# Patient Record
Sex: MALE | Race: WHITE | NOT HISPANIC OR LATINO | Employment: OTHER | URBAN - METROPOLITAN AREA
[De-identification: names, ages, dates, MRNs, and addresses within clinical notes are randomized per-mention and may not be internally consistent; named-entity substitution may affect disease eponyms.]

---

## 2024-10-18 ENCOUNTER — OFFICE VISIT (OUTPATIENT)
Dept: ENDOCRINOLOGY | Facility: CLINIC | Age: 54
End: 2024-10-18
Payer: MEDICAID

## 2024-10-18 VITALS
HEIGHT: 69 IN | WEIGHT: 211 LBS | BODY MASS INDEX: 31.25 KG/M2 | HEART RATE: 66 BPM | SYSTOLIC BLOOD PRESSURE: 154 MMHG | OXYGEN SATURATION: 99 % | DIASTOLIC BLOOD PRESSURE: 84 MMHG

## 2024-10-18 DIAGNOSIS — N62 GYNECOMASTIA: Primary | ICD-10-CM

## 2024-10-18 PROCEDURE — 84146 ASSAY OF PROLACTIN: CPT | Performed by: INTERNAL MEDICINE

## 2024-10-18 PROCEDURE — 83001 ASSAY OF GONADOTROPIN (FSH): CPT | Performed by: INTERNAL MEDICINE

## 2024-10-18 PROCEDURE — 83002 ASSAY OF GONADOTROPIN (LH): CPT | Performed by: INTERNAL MEDICINE

## 2024-10-18 PROCEDURE — 84403 ASSAY OF TOTAL TESTOSTERONE: CPT | Performed by: INTERNAL MEDICINE

## 2024-10-18 PROCEDURE — 84402 ASSAY OF FREE TESTOSTERONE: CPT | Performed by: INTERNAL MEDICINE

## 2024-10-18 PROCEDURE — 84270 ASSAY OF SEX HORMONE GLOBUL: CPT | Performed by: INTERNAL MEDICINE

## 2024-10-18 NOTE — ASSESSMENT & PLAN NOTE
He had transient gynecomastia while on testosterone injections.  No gynecomastia noted on exam today.    He has had mildly low total but normal free testosterone.  This may be due to low sex hormone binding globulin.  I would be reluctant to treat with testosterone at this time, especially with the gynecomastia.  I wonder if sleep apnea could be playing a role.  We discussed alternatives to CPAP.  He will investigate this further.

## 2024-10-18 NOTE — PROGRESS NOTES
"     Office Note      Date: 10/18/2024  Patient Name: Rj Nascimento  MRN: [id number]  : 1970    Chief Complaint   Patient presents with    Gynecomastia       History of Present Illness:   Rj Nascimento is a 53 y.o. male who presents for Gynecomastia    He saw his PCP about a year and 1/2 ago.  He was started on testosterone injections.  He notes breast tenderness on the right that started while on testosterone injections.  It started about 6 months after starting the injections.  The testosterone was stopped about a year ago.  The tenderness resolved.  He has mammogram that was reportedly okay.  Labs  from 2024 have shown mildly low total but normal free testosterone.  DHEA, LH and estradiol were normal.  BMP and TFTs were normal.  He denies taking any supplements.  He reports energy level is low.  He has been tested for sleep apnea but didn't tolerate CPAP treatment.  He hasn't noted any change in the size of his testes.  He notes decreased libido.  He denies any cardiovascular disease.  He denies any problems with prostate.  He denies any urinary complaints.      Subjective      Review of Systems:   Review of Systems   Constitutional:  Positive for fatigue.   HENT: Negative.     Eyes: Negative.    Respiratory: Negative.     Cardiovascular: Negative.    Gastrointestinal: Negative.    Endocrine: Negative.    Genitourinary: Negative.    Musculoskeletal: Negative.    Skin: Negative.    Allergic/Immunologic: Negative.    Neurological: Negative.    Hematological: Negative.    Psychiatric/Behavioral: Negative.         The following portions of the patient's history were reviewed and updated as appropriate: allergies, current medications, past family history, past medical history, past social history, past surgical history, and problem list.    Objective     Visit Vitals  /84 (BP Location: Left arm, Patient Position: Sitting, Cuff Size: Adult)   Pulse 66   Ht 175.3 cm (69\")   Wt 95.7 kg (211 lb) " "  SpO2 99%   BMI 31.16 kg/m²       Physical Exam:  Physical Exam  Constitutional:       Appearance: Normal appearance.   HENT:      Head: Normocephalic and atraumatic.   Eyes:      Extraocular Movements: Extraocular movements intact.      Conjunctiva/sclera: Conjunctivae normal.   Neck:      Thyroid: No thyroid mass, thyromegaly or thyroid tenderness.   Cardiovascular:      Rate and Rhythm: Normal rate and regular rhythm.      Pulses: Normal pulses.      Heart sounds: Normal heart sounds.   Pulmonary:      Effort: Pulmonary effort is normal.   Chest:   Breasts:     Right: Normal.      Left: Normal.   Abdominal:      General: Bowel sounds are normal.      Palpations: Abdomen is soft.   Musculoskeletal:         General: Normal range of motion.      Cervical back: Normal range of motion and neck supple.   Lymphadenopathy:      Cervical: No cervical adenopathy.   Skin:     General: Skin is warm and dry.   Neurological:      General: No focal deficit present.      Mental Status: He is alert.   Psychiatric:         Mood and Affect: Mood normal.         Behavior: Behavior normal.         Thought Content: Thought content normal.         Judgment: Judgment normal.         Labs:    TSH  No results found for: \"TSHBASE\"     Free T4  No results found for: \"FREET4\"    T3  No results found for: \"G2RUNEW\"      TPO  No results found for: \"THYROIDAB\"    TG AB  No results found for: \"THGAB\"    TG  No results found for: \"THYROGLB\"    CBC w/DIFF  No results found for: \"WBC\", \"RBC\", \"HGB\", \"HCT\", \"MCV\", \"MCH\", \"MCHC\", \"RDW\", \"RDWSD\", \"MPV\", \"PLT\", \"NEUTRORELPCT\", \"LYMPHORELPCT\", \"MONORELPCT\", \"EOSRELPCT\", \"BASORELPCT\", \"AUTOIGPER\", \"NEUTROABS\", \"LYMPHSABS\", \"MONOSABS\", \"EOSABS\", \"BASOSABS\", \"AUTOIGNUM\", \"NRBC\"        Assessment / Plan      Assessment & Plan:  Diagnoses and all orders for this visit:    1. Gynecomastia (Primary)  Assessment & Plan:  He had transient gynecomastia while on testosterone injections.  No gynecomastia noted on " exam today.    He has had mildly low total but normal free testosterone.  This may be due to low sex hormone binding globulin.  I would be reluctant to treat with testosterone at this time, especially with the gynecomastia.  I wonder if sleep apnea could be playing a role.  We discussed alternatives to CPAP.  He will investigate this further.    Orders:  -     Prolactin; Future  -     Testosterone Free MS / Dialysis; Future  -     Sex Horm Binding Globulin; Future  -     FSH & LH; Future      Current Outpatient Medications   Medication Instructions    aspirin 81 mg, Daily    atorvastatin (LIPITOR) 40 mg, Daily    citalopram (CELEXA) 20 mg, Daily    multivitamin with minerals tablet tablet 1 tablet, Daily    Unable to find 1 each, Once      Return if symptoms worsen or fail to improve.    Electronically signed by: Parminder Conde MD  10/18/2024

## 2024-10-19 LAB
FSH SERPL-ACNC: 3.74 MIU/ML
LH SERPL-ACNC: 3.37 MIU/ML
PROLACTIN SERPL-MCNC: 8.04 NG/ML (ref 4.04–15.2)

## 2024-10-20 LAB — SHBG SERPL-SCNC: 29.1 NMOL/L (ref 19.3–76.4)

## 2024-10-29 LAB
TESTOST FREE MFR SERPL: 2 %
TESTOST FREE SERPL-MCNC: 39 PG/ML
TESTOST SERPL-MCNC: 196 NG/DL

## 2025-07-17 ENCOUNTER — TELEPHONE (OUTPATIENT)
Dept: CARDIOLOGY | Facility: CLINIC | Age: 55
End: 2025-07-17
Payer: COMMERCIAL

## 2025-07-17 NOTE — TELEPHONE ENCOUNTER
"  Caller: Rj Nascimento \"Addison\"    Relationship: Self    Best call back number: [phone]    What is the best time to reach you: ANY    Who are you requesting to speak with (clinical staff, provider,  specific staff member): CLINICAL    What was the call regarding: PT WAS SUMMONED TO [employer] THE SAME DAY HE IS SEEING DR. SMITH AND IS REQUESTING A MEDICAL EXEMPTION FORM TO GET OUT OF [employer] SO HE DOESN'T HAVE TO CANCEL HIS APPT AND MISS IT.       "

## 2025-07-17 NOTE — TELEPHONE ENCOUNTER
Appt scheduled sooner to accommodate for jury duty.  EPIC chat message sent to Fatmata ARRINGTON to notify pt of revised appt date and time.

## 2025-08-25 ENCOUNTER — OFFICE VISIT (OUTPATIENT)
Dept: CARDIOLOGY | Facility: CLINIC | Age: 55
End: 2025-08-25
Payer: COMMERCIAL

## 2025-08-25 VITALS
HEART RATE: 84 BPM | DIASTOLIC BLOOD PRESSURE: 90 MMHG | WEIGHT: 213 LBS | SYSTOLIC BLOOD PRESSURE: 140 MMHG | BODY MASS INDEX: 31.55 KG/M2 | HEIGHT: 69 IN | OXYGEN SATURATION: 99 %

## 2025-08-25 DIAGNOSIS — E78.2 MIXED HYPERLIPIDEMIA: ICD-10-CM

## 2025-08-25 DIAGNOSIS — R07.2 PRECORDIAL PAIN: Primary | ICD-10-CM

## 2025-08-25 DIAGNOSIS — R06.02 SHORTNESS OF BREATH: ICD-10-CM

## 2025-08-25 DIAGNOSIS — I10 ESSENTIAL HYPERTENSION: ICD-10-CM

## 2025-08-25 PROCEDURE — 3080F DIAST BP >= 90 MM HG: CPT | Performed by: SPECIALIST

## 2025-08-25 PROCEDURE — 99214 OFFICE O/P EST MOD 30 MIN: CPT | Performed by: SPECIALIST

## 2025-08-25 PROCEDURE — 3077F SYST BP >= 140 MM HG: CPT | Performed by: SPECIALIST

## 2025-08-25 RX ORDER — MELOXICAM 15 MG/1
15 TABLET ORAL DAILY
Qty: 30 TABLET | Refills: 0 | Status: SHIPPED | OUTPATIENT
Start: 2025-08-25

## 2025-08-25 RX ORDER — LISINOPRIL 40 MG/1
40 TABLET ORAL DAILY
Qty: 90 TABLET | Refills: 1 | Status: SHIPPED | OUTPATIENT
Start: 2025-08-25

## 2025-08-25 RX ORDER — LISINOPRIL 10 MG/1
10 TABLET ORAL DAILY
COMMUNITY
End: 2025-08-25

## 2025-08-25 RX ORDER — LISINOPRIL 40 MG/1
40 TABLET ORAL DAILY
Qty: 90 TABLET | Refills: 1 | Status: SHIPPED | COMMUNITY
Start: 2025-08-25 | End: 2025-08-25 | Stop reason: SDUPTHER